# Patient Record
(demographics unavailable — no encounter records)

---

## 2025-04-22 NOTE — HISTORY OF PRESENT ILLNESS
[FreeTextEntry1] : OB: Dr. Sandhu  LMP: 9/11/2024 NAZANIN: 6/28/2025 Weeks: 30 weeks  G 2 P1   Scheduled c/s on 6/25/2025 @ [facility]   Sonograms:20 weeks anatomy, measuring normal at that time     Plan to Breastfeed: maybe    Diagnosed: GDM Onset: 2 hour GTT Family History: Paternal Grandfather Ethnicity: White    s/p failed OGTT March 2025: 2 hour OGTT Fasting 121 1 hour 240 2 hour 108   GDM Hx: 1st pregnancy in 2020 conceived via IUI, was followed by MFM at that time and treated with daily insulin c/s due to breech, 8 pounds 12 ounces, no  nicu, 39+ weeks  -> reports she did not have a OGGT post-partum but lost weight an A1C after was wnl Now 2nd pregnancy (conceived naturally) - failed OGTT    HgbA1C 6.5%   SMBG 4 x's a day - logs scanned in  Fasting 92 90 94 90  112 104 108  108 117 110  120 119 118   *s/p  gastric sleeve in 2014, lost about 100 pounds, takes longer to east, sometimes can take her 45 mins   In Office: 107--> ate around 12:15    Current drug regimen:  PNV   Weight: Pre-Preg 248 lbs  Diet: Following Gestational Diet  protein shakes and cheese sticks for breakfast can not always have night time snack  Exercise: walks Smoking: none

## 2025-04-22 NOTE — ASSESSMENT
[FreeTextEntry1] : GDM -Continue to test glucose 4x's a day -Goal for blood glucose levels: in AM Fating <90 and one hour after meals <120 -Check for urine ketones in the AM -Send logs once a week through patient portal - Diet (30-60 gm carbohydrates with each meal, 15 grams with snacks. Balance with lean proteins and low fat diet.)  - Exercise daily as tolerated, work up to 20- 30 minutes a day. Walks after meals  -.Medication, risks and benefits reviewed. ***Glucose testing and insulin/ medication *** administration for glycemic management  -GDM Education: Discussed the immediate risks to the mother with GDM are an increased incidence of  delivery (~30%), preeclampsia (~20-30%), and polyhydramnios (~20%) which can result in  labor. The long-term risks to the mother regarding diabetes complications during pregnancy. Counseling on diet, and exercise was given. Fetal/Infant risks discussed and include macrosomia, risk for shoulder dystocia/Erb's palsy, clavicular fractures, fetal distress, low APGAR scores, and even birth asphyxia when unrecognized, respiratory distress syndrome may occur in up to 31% of infants while cardiac septal hypertrophy may be seen in 35-40%., with extremely poor glucose control, there is also an increased risk of fetal mortality due to fetal acidemia and hypoxia. Common metabolic abnormalities in the infant of a diabetic mother include  hypoglycemia, hypocalcemia, hyperbilirubinemia and polycythemia.  hypoglycemia is common in women in suboptimal glycemic control. Also the long-term sequelae of hyperglycemia during pregnancy for offspring and increased risk of adolescent obesity and of Type 2 diabetes.  -Breastfeeding encouraged. Fetal surveillance as per OB recommendations (fetal ultrasound, fetal movement records, nonstress test, biophysical profile) Follow-up in  2 weeks; call if develop other problems/ questions. Call to report hypoglycemia or sustained hyperglycemia.  -Discussed insulin resistance in pregnancy  -May need NPH -Pt will need insulin teach apt  RTO every 2 weeks with CDE or NP - TEB ok

## 2025-04-22 NOTE — REVIEW OF SYSTEMS
[Fatigue] : no fatigue [Dysphagia] : no dysphagia [Chest Pain] : no chest pain [Palpitations] : no palpitations [Shortness Of Breath] : no shortness of breath [Nausea] : no nausea [Constipation] : no constipation [Vomiting] : no vomiting [Diarrhea] : no diarrhea [Polyuria] : no polyuria [Polydipsia] : no polydipsia

## 2025-06-20 NOTE — END OF VISIT
[Time Spent: ___ minutes] : I have spent [unfilled] minutes of time on the encounter which excludes teaching and separately reported services.
[Time Spent: ___ minutes] : I have spent [unfilled] minutes of time on the encounter which excludes teaching and separately reported services.
s/p cystoscopy, Right PCNL

## 2025-06-24 NOTE — REASON FOR VISIT
[Follow - Up] : a follow-up visit [Gestational Diabetes] : gestational diabetes [Home] : at home, [unfilled] , at the time of the visit. [Medical Office: ([facility])___] : at the medical office located in  [Telehealth (audio & video)] : This visit was provided via telehealth using real-time 2-way audio visual technology. [Verbal consent obtained from patient] : the patient, [unfilled]

## 2025-06-24 NOTE — HISTORY OF PRESENT ILLNESS
[FreeTextEntry1] : OB: Dr. Sandhu  LMP: 9/11/2024 NAZANIN: 6/28/2025 Weeks: 39 weeks  G 2 P1   Scheduled c/s on 6/25/2025 @ [facility]   Sonograms: last week, 8 pounds  measuring again tonight    Plan to Breastfeed: maybe    Diagnosed: GDM Onset: 2 hour GTT Family History: Paternal Grandfather Ethnicity: White    s/p failed OGTT March 2025: 2 hour OGTT Fasting 121 1 hour 240 2 hour 108   GDM Hx: 1st pregnancy in 2020 conceived via IUI, was followed by MFM at that time and treated with daily insulin c/s due to breech, 8 pounds 12 ounces, no  nicu, 39+ weeks  -> reports she did not have a OGGT post-partum but lost weight an A1C after was wnl Now 2nd pregnancy (conceived naturally) - failed OGTT    HgbA1C 6.5%   SMBG 4 x's a day - logs scanned in  Fasting 86 85 97 84 88 86 85 AB  106 112 117 102 100 112 117  115 102 104 106 118 117  113 112 115 108 110 104 No urine ketone  Has been walking 30 mins before bed  on some night    *s/p  gastric sleeve in 2014, lost about 100 pounds, takes longer to east, sometimes can take her 45 mins   Current drug regimen:  PNV   Weight: Pre-Preg 248 lbs now 283 pounds  Diet: Following Gestational Diet  protein shakes and cheese sticks for breakfast can not always have night time snack  Exercise: walks Smoking: none

## 2025-06-24 NOTE — REASON FOR VISIT
[Follow - Up] : a follow-up visit [Gestational Diabetes] : gestational diabetes [Home] : at home, [unfilled] , at the time of the visit. [Medical Office: ([facility])___] : at the medical office located in  [Telehealth (audio & video)] : This visit was provided via telehealth using real-time 2-way audio visual technology. [Verbal consent obtained from patient] : the patient, [unfilled]

## 2025-06-24 NOTE — HISTORY OF PRESENT ILLNESS
[FreeTextEntry1] : OB: Dr. Sandhu  LMP: 9/11/2024 NAZANIN: 6/28/2025 Weeks: 39 weeks  G 2 P1   Scheduled c/s on 6/25/2025 @ [facility]   Sonograms: last week, 8 pounds  measuring again tonight    Plan to Breastfeed: maybe    Diagnosed: GDM Onset: 2 hour GTT Family History: Paternal Grandfather Ethnicity: White    s/p failed OGTT March 2025: 2 hour OGTT Fasting 121 1 hour 240 2 hour 108   GDM Hx: 1st pregnancy in 2020 conceived via IUI, was followed by MFM at that time and treated with daily insulin c/s due to breech, 8 pounds 12 ounces, no  nicu, 39+ weeks  -> reports she did not have a OGGT post-partum but lost weight an A1C after was wnl Now 2nd pregnancy (conceived naturally) - failed OGTT    HgbA1C 6.5%   SMBG 4 x's a day - logs scanned in  Fasting 86 85 97 84 88 86 85 AB  106 112 117 102 100 112 117  115 102 104 106 118 117  113 112 115 108 110 104 No urine ketone  Has been walking 30 mins before bed  on some night    *s/p  gastric sleeve in 2014, lost about 100 pounds, takes longer to east, sometimes can take her 45 mins   Current drug regimen:  PNV   Weight: Pre-Preg 248 lbs now 283 pounds  Diet: Following Gestational Diet  protein shakes and cheese sticks for breakfast can not always have night time snack  Exercise: walks Smoking: none

## 2025-06-24 NOTE — ASSESSMENT
[FreeTextEntry1] : GDM -Continue to test glucose 4x's a day -Goal for blood glucose levels: in AM Fating <90 and one hour after meals <120 -Check for urine ketones in the AM - Diet (30-60 gm carbohydrates with each meal, 15 grams with snacks. Balance with lean proteins and low fat diet.)  - Exercise daily as tolerated, work up to 20- 30 minutes a day. Walks after meals  -Breastfeeding encouraged. Fetal surveillance as per OB recommendations (fetal ultrasound, fetal movement records, nonstress test, biophysical profile) Follow-up in  2 weeks; call if develop other problems/ questions. Call to report hypoglycemia or sustained hyperglycemia.  -Continue walk when she can  -after delievery can stop checking glucose numbers -Repeat GTT 6-8 weeks post partum   RTO in 6-8 weeks postparum